# Patient Record
Sex: FEMALE | Race: WHITE | NOT HISPANIC OR LATINO | URBAN - METROPOLITAN AREA
[De-identification: names, ages, dates, MRNs, and addresses within clinical notes are randomized per-mention and may not be internally consistent; named-entity substitution may affect disease eponyms.]

---

## 2019-08-30 ENCOUNTER — HOSPITAL ENCOUNTER (OUTPATIENT)
Dept: OTHER | Facility: HOSPITAL | Age: 24
Discharge: HOME | End: 2019-08-30

## 2019-08-30 LAB
ALBUMIN SERPL-MCNC: 4.6 G/DL (ref 3.5–5)
ALBUMIN/GLOB SERPL: 1.7 {RATIO} (ref 1.4–2.6)
ALP SERPL-CCNC: 72 U/L (ref 42–98)
ALT SERPL-CCNC: 6 U/L (ref 10–40)
ANION GAP SERPL CALC-SCNC: 17 MMOL/L (ref 8–19)
AST SERPL-CCNC: 13 U/L (ref 15–50)
BASOPHILS # BLD AUTO: 0.04 10*3/UL (ref 0–0.2)
BASOPHILS NFR BLD AUTO: 0.6 % (ref 0–3)
BILIRUB SERPL-MCNC: 0.34 MG/DL (ref 0.2–1.3)
BUN SERPL-MCNC: 9 MG/DL (ref 5–25)
BUN/CREAT SERPL: 9 {RATIO} (ref 6–20)
CALCIUM SERPL-MCNC: 9.6 MG/DL (ref 8.7–10.4)
CHLORIDE SERPL-SCNC: 102 MMOL/L (ref 99–111)
CHOLEST SERPL-MCNC: 174 MG/DL (ref 107–200)
CHOLEST/HDLC SERPL: 3.1 {RATIO} (ref 3–6)
CONV ABS IMM GRAN: 0.02 10*3/UL (ref 0–0.2)
CONV CO2: 27 MMOL/L (ref 22–32)
CONV IMMATURE GRAN: 0.3 % (ref 0–1.8)
CONV TOTAL PROTEIN: 7.3 G/DL (ref 6.3–8.2)
CREAT UR-MCNC: 0.95 MG/DL (ref 0.5–0.9)
DEPRECATED RDW RBC AUTO: 44.1 FL (ref 36.4–46.3)
EOSINOPHIL # BLD AUTO: 0.16 10*3/UL (ref 0–0.7)
EOSINOPHIL # BLD AUTO: 2.5 % (ref 0–7)
ERYTHROCYTE [DISTWIDTH] IN BLOOD BY AUTOMATED COUNT: 13.6 % (ref 11.7–14.4)
GFR SERPLBLD BASED ON 1.73 SQ M-ARVRAT: >60 ML/MIN/{1.73_M2}
GLOBULIN UR ELPH-MCNC: 2.7 G/DL (ref 2–3.5)
GLUCOSE SERPL-MCNC: 91 MG/DL (ref 65–99)
HCT VFR BLD AUTO: 40.7 % (ref 37–47)
HDLC SERPL-MCNC: 56 MG/DL (ref 40–60)
HGB BLD-MCNC: 12.5 G/DL (ref 12–16)
LDLC SERPL CALC-MCNC: 107 MG/DL (ref 70–100)
LYMPHOCYTES # BLD AUTO: 2.14 10*3/UL (ref 1–5)
LYMPHOCYTES NFR BLD AUTO: 33.9 % (ref 20–45)
MCH RBC QN AUTO: 27.2 PG (ref 27–31)
MCHC RBC AUTO-ENTMCNC: 30.7 G/DL (ref 33–37)
MCV RBC AUTO: 88.7 FL (ref 81–99)
MONOCYTES # BLD AUTO: 0.34 10*3/UL (ref 0.2–1.2)
MONOCYTES NFR BLD AUTO: 5.4 % (ref 3–10)
NEUTROPHILS # BLD AUTO: 3.62 10*3/UL (ref 2–8)
NEUTROPHILS NFR BLD AUTO: 57.3 % (ref 30–85)
NRBC CBCN: 0 % (ref 0–0.7)
OSMOLALITY SERPL CALC.SUM OF ELEC: 292 MOSM/KG (ref 273–304)
PLATELET # BLD AUTO: 258 10*3/UL (ref 130–400)
PMV BLD AUTO: 12.1 FL (ref 9.4–12.3)
POTASSIUM SERPL-SCNC: 4 MMOL/L (ref 3.5–5.3)
RBC # BLD AUTO: 4.59 10*6/UL (ref 4.2–5.4)
SODIUM SERPL-SCNC: 142 MMOL/L (ref 135–147)
TRIGL SERPL-MCNC: 55 MG/DL (ref 40–150)
TSH SERPL-ACNC: 0.74 M[IU]/L (ref 0.27–4.2)
VLDLC SERPL-MCNC: 11 MG/DL (ref 5–37)
WBC # BLD AUTO: 6.32 10*3/UL (ref 4.8–10.8)

## 2019-09-05 ENCOUNTER — HOSPITAL ENCOUNTER (OUTPATIENT)
Dept: OTHER | Facility: HOSPITAL | Age: 24
Discharge: HOME | End: 2019-09-05

## 2019-09-12 ENCOUNTER — HOSPITAL ENCOUNTER (OUTPATIENT)
Dept: OTHER | Facility: HOSPITAL | Age: 24
Discharge: HOME | End: 2019-09-12

## 2019-09-13 LAB
CONV MUMPS ANTIBODY IGG: 42 AU/ML
HBV SURFACE AB SER QL: NON REACTIVE
MEV IGG SER IA-ACNC: 43.4 AU/ML
RUBV IGG SER-ACNC: 11.54 [IU]/ML
VZV IGG SER IA-ACNC: 2640 INDEX

## 2019-11-08 ENCOUNTER — HOSPITAL ENCOUNTER (OUTPATIENT)
Dept: OTHER | Facility: HOSPITAL | Age: 24
Discharge: HOME | End: 2019-11-08

## 2019-11-08 ENCOUNTER — OFFICE VISIT CONVERTED (OUTPATIENT)
Dept: FAMILY MEDICINE CLINIC | Age: 24
End: 2019-11-08
Attending: NURSE PRACTITIONER

## 2019-11-12 ENCOUNTER — HOSPITAL ENCOUNTER (OUTPATIENT)
Dept: OTHER | Facility: HOSPITAL | Age: 24
Discharge: HOME | End: 2019-11-12

## 2021-05-18 NOTE — PROGRESS NOTES
Shital Jean 1995     Office/Outpatient Visit    Visit Date:  04:42 pm    Provider: Ivet James N.P. (Assistant: Swapna Reina RN)    Location: [facility]        Electronically signed by Ivet James N.P. on  2019 05:59:01 PM                             SUBJECTIVE:        CC:     Ms. Jean is a 24 year old White female.  This is her first visit to the clinic.  establish care;         HPI:         Patient complains of pelvic pain, female.  Pelvic pain: Pt states pelvic pain x 2-3 days. She has an IUD, Mirena and is worried that it may be displaced. Has noticed vaginal discharge.      ROS:     CONSTITUTIONAL:  Negative for chills, fatigue, fever, and weight change.      CARDIOVASCULAR:  Negative for chest pain, palpitations, tachycardia, orthopnea, and edema.      RESPIRATORY:  Negative for cough, dyspnea, and hemoptysis.      GASTROINTESTINAL:  Positive for abdominal pain ( suprapubic ).      MUSCULOSKELETAL:  Negative for arthralgias, back pain, and myalgias.      NEUROLOGICAL:  Negative for dizziness, headaches, paresthesias, and weakness.          PMH/FM/SH:     Last Reviewed on 2019 05:04 PM by Ivet James    Past Medical History:                 PAST MEDICAL HISTORY     UNREMARKABLE         Surgical History:          section: X ; ;         Family History:     Father: colitis     Mother: Healthy     Brother(s): 0 brother(s) total     Sister(s): Healthy; 4 sister(s) total     Son(s): 0 son(s) total     Daughter(s): 1 daughter(s) total     Paternal Grandfather: Colon Cancer     Paternal Grandmother: Healthy     Maternal Grandfather: ;  Cirrhosis     Maternal Grandmother: Healthy         Social History:     Occupation: [employer];     Marital Status: Single     Children: 1 child     Ms. Jean denies any current form of exercise.          Tobacco/Alcohol/Supplements:     Last Reviewed on 2019 05:04 PM by  Ivet James    Tobacco: She has a past history of cigarette smoking; quit date:  1 year ago.          Alcohol: Frequency: Socially         Substance Abuse History:     Last Reviewed on 11/08/2019 05:04 PM by Ivet James    None         Mental Health History:     Last Reviewed on 11/08/2019 05:04 PM by Ivet James        Communicable Diseases (eg STDs):     Last Reviewed on 11/08/2019 05:04 PM by Ivet James            Current Problems:     Last Reviewed on 11/08/2019 05:04 PM by Ivet James    Dysthymic disorder     Hypothyroidism         Immunizations:     Fluarix pf 10/21/2019         Allergies:     Last Reviewed on 11/08/2019 05:04 PM by Ivet James      No Known Drug Allergies.         Current Medications:     Last Reviewed on 11/08/2019 05:04 PM by Ivet James    Levothyroxine Sodium 100mcg Capsules Take 1 capsule(s) by mouth daily before breakfast.     Lexapro 20mg Tablet 1 tab daily         OBJECTIVE:        Vitals:         Current: 11/8/2019 4:57:15 PM    Ht:  5 ft, 5 in;  Wt: 190 lbs;  BMI: 31.6T: 98.2 F (oral);  BP: 120/68 mm Hg (right arm, sitting);  P: 76 bpm (right arm (BP Cuff), sitting)        Exams:     PHYSICAL EXAM:     GENERAL:  well developed and nourished; appropriately groomed; in no apparent distress;     RESPIRATORY:  lungs clear to auscultation and percussion; symmetric expansion; no dyspnea;     CARDIOVASCULAR: regular rate and rhythm; normal S1, S2; no murmur, rub, or gallop; normal PMI;     GASTROINTESTINAL: mild suprapubic tenderness;     GENITOURINARY: Pelvic exam with speculum, unable to visualize the mirena wire due to heavy discharge. Sample taken and sent.;         ASSESSMENT            625.9    R10.2    Pelvic pain, female      616.10    N76.0    Vaginitis          ORDERS:         Lab Orders:       [id number]  VAGSC - [facility] VAG SCREEN CANDIDA,JOHN, & TRICH [id number]   (Send-Out)                      PLAN:         Vaginitis     LABORATORY:  Labs  ordered to be performed today include Vaginal Screen.      FOLLOW-UP: Advised to call if there is no improvement.:.            Orders:       [id number]  [facility] - [facility] VAG SCREEN CANDIDA,JOHN, & TRICH [id number]   (Send-Out)                  Patient Recommendations:        For  Vaginitis:                     APPOINTMENT INFORMATION:        Monday Tuesday Wednesday Thursday Friday Saturday Sunday            Time:___________________AM  PM   Date:_____________________             CHARGE CAPTURE            **Please note: ICD descriptions below are intended for billing purposes only and may not represent clinical diagnoses**        Primary Diagnosis:         625.9  Pelvic pain, female         R10.2 Pelvic and perineal pain          Orders:        [id number]    Office visit - new pt, level 2   (In-House)              616.10  Vaginitis         N76.0 Acute vaginitis        ADDENDUMS:      ____________________________________    Addendum: 11/12/2019 02:32 PM - Ivet James        Pt requesting to have a TV U/S due to being worried about her IUD being misplaced. Order given.

## 2021-07-01 VITALS
HEART RATE: 76 BPM | TEMPERATURE: 98.2 F | HEIGHT: 65 IN | BODY MASS INDEX: 31.65 KG/M2 | WEIGHT: 190 LBS | DIASTOLIC BLOOD PRESSURE: 68 MMHG | SYSTOLIC BLOOD PRESSURE: 120 MMHG